# Patient Record
Sex: FEMALE | URBAN - NONMETROPOLITAN AREA
[De-identification: names, ages, dates, MRNs, and addresses within clinical notes are randomized per-mention and may not be internally consistent; named-entity substitution may affect disease eponyms.]

---

## 2018-10-18 ENCOUNTER — APPOINTMENT (OUTPATIENT)
Dept: URBAN - NONMETROPOLITAN AREA CLINIC 39 | Age: 28
Setting detail: DERMATOLOGY
End: 2018-10-18

## 2018-10-18 DIAGNOSIS — L65.9 NONSCARRING HAIR LOSS, UNSPECIFIED: ICD-10-CM

## 2018-10-18 PROBLEM — F32.9 MAJOR DEPRESSIVE DISORDER, SINGLE EPISODE, UNSPECIFIED: Status: ACTIVE | Noted: 2018-10-18

## 2018-10-18 PROBLEM — J45.909 UNSPECIFIED ASTHMA, UNCOMPLICATED: Status: ACTIVE | Noted: 2018-10-18

## 2018-10-18 PROBLEM — F41.9 ANXIETY DISORDER, UNSPECIFIED: Status: ACTIVE | Noted: 2018-10-18

## 2018-10-18 PROCEDURE — 99202 OFFICE O/P NEW SF 15 MIN: CPT

## 2018-10-18 PROCEDURE — OTHER MIPS QUALITY: OTHER

## 2018-10-18 PROCEDURE — OTHER ORDER TESTS: OTHER

## 2018-10-18 PROCEDURE — OTHER COUNSELING: OTHER

## 2018-10-18 PROCEDURE — OTHER ADDITIONAL NOTES: OTHER

## 2018-10-18 PROCEDURE — OTHER RECOMMENDATIONS: OTHER

## 2018-10-18 NOTE — PROCEDURE: ADDITIONAL NOTES
Additional Notes: Pt has irregular menses.   Pt instructed to contact PCP to reschedule US
Detail Level: Simple

## 2018-10-29 ENCOUNTER — RX ONLY (RX ONLY)
Age: 28
End: 2018-10-29

## 2018-10-29 RX ORDER — FLUOCINONIDE 0.5 MG/ML
SOLUTION TOPICAL
Qty: 1 | Refills: 2 | Status: ERX | COMMUNITY
Start: 2018-10-29